# Patient Record
Sex: MALE | Race: ASIAN | NOT HISPANIC OR LATINO | Employment: FULL TIME | URBAN - METROPOLITAN AREA
[De-identification: names, ages, dates, MRNs, and addresses within clinical notes are randomized per-mention and may not be internally consistent; named-entity substitution may affect disease eponyms.]

---

## 2017-12-14 ENCOUNTER — OFFICE VISIT (OUTPATIENT)
Dept: INTERNAL MEDICINE | Facility: CLINIC | Age: 27
End: 2017-12-14
Payer: COMMERCIAL

## 2017-12-14 ENCOUNTER — HOSPITAL ENCOUNTER (OUTPATIENT)
Dept: RADIOLOGY | Facility: HOSPITAL | Age: 27
Discharge: HOME | End: 2017-12-14
Attending: FAMILY MEDICINE
Payer: COMMERCIAL

## 2017-12-14 VITALS
DIASTOLIC BLOOD PRESSURE: 62 MMHG | WEIGHT: 160.94 LBS | HEART RATE: 68 BPM | HEIGHT: 67 IN | BODY MASS INDEX: 25.26 KG/M2 | TEMPERATURE: 98 F | SYSTOLIC BLOOD PRESSURE: 110 MMHG

## 2017-12-14 DIAGNOSIS — M25.532 LEFT WRIST PAIN: ICD-10-CM

## 2017-12-14 DIAGNOSIS — Z00.00 GENERAL MEDICAL EXAM: ICD-10-CM

## 2017-12-14 DIAGNOSIS — M25.532 LEFT WRIST PAIN: Primary | ICD-10-CM

## 2017-12-14 PROCEDURE — 90686 IIV4 VACC NO PRSV 0.5 ML IM: CPT | Mod: S$GLB,,, | Performed by: FAMILY MEDICINE

## 2017-12-14 PROCEDURE — 99999 PR PBB SHADOW E&M-NEW PATIENT-LVL IV: CPT | Mod: PBBFAC,,, | Performed by: FAMILY MEDICINE

## 2017-12-14 PROCEDURE — 73130 X-RAY EXAM OF HAND: CPT | Mod: TC,PO,LT

## 2017-12-14 PROCEDURE — 99385 PREV VISIT NEW AGE 18-39: CPT | Mod: 25,S$GLB,, | Performed by: FAMILY MEDICINE

## 2017-12-14 PROCEDURE — 73130 X-RAY EXAM OF HAND: CPT | Mod: 26,LT,, | Performed by: RADIOLOGY

## 2017-12-14 PROCEDURE — 90471 IMMUNIZATION ADMIN: CPT | Mod: S$GLB,,, | Performed by: FAMILY MEDICINE

## 2017-12-15 ENCOUNTER — TELEPHONE (OUTPATIENT)
Dept: INTERNAL MEDICINE | Facility: CLINIC | Age: 27
End: 2017-12-15

## 2017-12-15 NOTE — TELEPHONE ENCOUNTER
Spoke with pt. Advised of results and recommendations. Pt voiced understanding and has appt with ortho.

## 2017-12-15 NOTE — TELEPHONE ENCOUNTER
----- Message from Elie Mendes MD sent at 12/15/2017  9:44 AM CST -----  Pt hasn't yet signed up for ARNALDO so please call him and tell him radiology review of his left hand xray was normal. Take otc ibuprofen as we discussed and referral was given for ortho. thx

## 2017-12-18 NOTE — PROGRESS NOTES
Subjective:   Patient ID: Indigo Garcia is a 27 y.o. male.    Chief Complaint: Annual Exam      HPI  28 yo here for annual exam and having left wrist pain that is gradually getting worse. Sometimes it seems as if hand is a bit weaker. No paresthesias. No ho trauma to area. Nothing seems to make it worse other than palpation worsens pain. NSAIDs help for a bit. No shoulder or neck pain.   Patient queried and denies any further complaints    PREVENTIVE MED  Diet  Exercise  Colorectal Ca  Alcohol use  Tobacco  BP  Depression  Type 2 DM  Hep C  STD  Vision  ALL REVIEWED      PAST MEDICAL HISTORY:  History reviewed. No pertinent past medical history.    PAST SURGICAL HISTORY:  History reviewed. No pertinent surgical history.    SOCIAL HISTORY:  Social History     Social History    Marital status: Single     Spouse name: N/A    Number of children: N/A    Years of education: N/A     Occupational History    Not on file.     Social History Main Topics    Smoking status: Never Smoker    Smokeless tobacco: Never Used    Alcohol use Not on file    Drug use: Unknown    Sexual activity: Not on file     Other Topics Concern    Not on file     Social History Narrative    No narrative on file       FAMILY HISTORY:  History reviewed. No pertinent family history.    ALLERGIES AND MEDICATIONS: updated and reviewed.  Review of patient's allergies indicates:  No Known Allergies  No current outpatient prescriptions on file.    Review of Systems   Constitutional: Negative for activity change, appetite change, chills, diaphoresis, fatigue, fever and unexpected weight change.   HENT: Negative for congestion, ear discharge, ear pain, postnasal drip, rhinorrhea, sneezing and sore throat.    Eyes: Negative for photophobia and discharge.   Respiratory: Negative for cough, chest tightness, shortness of breath and wheezing.    Cardiovascular: Negative for chest pain and palpitations.   Gastrointestinal: Negative for abdominal distention,  "abdominal pain, diarrhea, nausea and vomiting.   Genitourinary: Negative for dysuria.   Musculoskeletal: Negative for arthralgias and neck pain.   Skin: Negative for rash.   Neurological: Negative for headaches.       Objective:     Vitals:    12/14/17 1429   BP: 110/62   Pulse: 68   Temp: 98.1 °F (36.7 °C)   TempSrc: Oral   Weight: 73 kg (160 lb 15 oz)   Height: 5' 7" (1.702 m)   PainSc: 0-No pain     Body mass index is 25.21 kg/m².    Physical Exam   Constitutional: He appears well-developed and well-nourished.   HENT:   Head: Normocephalic and atraumatic.   Right Ear: Hearing, tympanic membrane, external ear and ear canal normal. No drainage or swelling. No decreased hearing is noted.   Left Ear: Hearing, tympanic membrane, external ear and ear canal normal. No drainage or swelling. No decreased hearing is noted.   Nose: Nose normal. No rhinorrhea.   Mouth/Throat: Oropharynx is clear and moist. No oropharyngeal exudate, posterior oropharyngeal edema or posterior oropharyngeal erythema.   Eyes: Conjunctivae, EOM and lids are normal. Pupils are equal, round, and reactive to light. Right eye exhibits no discharge and no exudate. Left eye exhibits no discharge and no exudate. Right conjunctiva is not injected. Left conjunctiva is not injected.   Neck: Trachea normal and full passive range of motion without pain. Normal carotid pulses, no hepatojugular reflux and no JVD present. Carotid bruit is not present. No neck rigidity. No edema and no erythema present. No thyroid mass and no thyromegaly present.   Cardiovascular: Normal rate, regular rhythm and normal heart sounds.    Pulmonary/Chest: Effort normal. No respiratory distress.   Abdominal: Soft. Normal appearance and bowel sounds are normal. There is no tenderness. There is negative Beckwith's sign.   Musculoskeletal:        Left wrist: He exhibits tenderness (no tenderness in anatomical snuff box.) and bony tenderness. He exhibits normal range of motion. "   Lymphadenopathy:     He has no cervical adenopathy.   Neurological: He is alert.   Skin: Skin is warm and dry.   Psychiatric: He has a normal mood and affect. His speech is normal and behavior is normal.       Assessment and Plan:   Indigo was seen today for annual exam.    Diagnoses and all orders for this visit:    Left wrist pain    -     Ambulatory Referral to Orthopedics  -     X-Ray Hand 3 View Left; Future    General medical exam  -     CBC auto differential; Future  -     Comprehensive metabolic panel; Future  -     Lipid panel; Future  -     TSH; Future  -     T4, free; Future  -     Vitamin D; Future    Other orders  -     Influenza - Quadrivalent (3 years & older) (PF)        Return in about 1 year (around 12/14/2018).    THIS NOTE WILL BE SHARED WITH THE PATIENT.

## 2017-12-19 ENCOUNTER — LAB VISIT (OUTPATIENT)
Dept: LAB | Facility: HOSPITAL | Age: 27
End: 2017-12-19
Attending: FAMILY MEDICINE
Payer: COMMERCIAL

## 2017-12-19 DIAGNOSIS — Z00.00 GENERAL MEDICAL EXAM: ICD-10-CM

## 2017-12-19 LAB
25(OH)D3+25(OH)D2 SERPL-MCNC: 7 NG/ML
ALBUMIN SERPL BCP-MCNC: 3.9 G/DL
ALP SERPL-CCNC: 89 U/L
ALT SERPL W/O P-5'-P-CCNC: 8 U/L
ANION GAP SERPL CALC-SCNC: 8 MMOL/L
AST SERPL-CCNC: 18 U/L
BASOPHILS # BLD AUTO: 0.06 K/UL
BASOPHILS NFR BLD: 0.7 %
BILIRUB SERPL-MCNC: 1.1 MG/DL
BUN SERPL-MCNC: 19 MG/DL
CALCIUM SERPL-MCNC: 9.7 MG/DL
CHLORIDE SERPL-SCNC: 102 MMOL/L
CHOLEST SERPL-MCNC: 174 MG/DL
CHOLEST/HDLC SERPL: 2.6 {RATIO}
CO2 SERPL-SCNC: 28 MMOL/L
CREAT SERPL-MCNC: 0.9 MG/DL
DIFFERENTIAL METHOD: NORMAL
EOSINOPHIL # BLD AUTO: 0.1 K/UL
EOSINOPHIL NFR BLD: 1.4 %
ERYTHROCYTE [DISTWIDTH] IN BLOOD BY AUTOMATED COUNT: 12.5 %
EST. GFR  (AFRICAN AMERICAN): >60 ML/MIN/1.73 M^2
EST. GFR  (NON AFRICAN AMERICAN): >60 ML/MIN/1.73 M^2
GLUCOSE SERPL-MCNC: 94 MG/DL
HCT VFR BLD AUTO: 47.7 %
HDLC SERPL-MCNC: 68 MG/DL
HDLC SERPL: 39.1 %
HGB BLD-MCNC: 15.8 G/DL
IMM GRANULOCYTES # BLD AUTO: 0.02 K/UL
IMM GRANULOCYTES NFR BLD AUTO: 0.2 %
LDLC SERPL CALC-MCNC: 77.4 MG/DL
LYMPHOCYTES # BLD AUTO: 2.3 K/UL
LYMPHOCYTES NFR BLD: 27 %
MCH RBC QN AUTO: 29.7 PG
MCHC RBC AUTO-ENTMCNC: 33.1 G/DL
MCV RBC AUTO: 90 FL
MONOCYTES # BLD AUTO: 0.6 K/UL
MONOCYTES NFR BLD: 7.3 %
NEUTROPHILS # BLD AUTO: 5.4 K/UL
NEUTROPHILS NFR BLD: 63.4 %
NONHDLC SERPL-MCNC: 106 MG/DL
NRBC BLD-RTO: 0 /100 WBC
PLATELET # BLD AUTO: 250 K/UL
PMV BLD AUTO: 11 FL
POTASSIUM SERPL-SCNC: 3.9 MMOL/L
PROT SERPL-MCNC: 7 G/DL
RBC # BLD AUTO: 5.32 M/UL
SODIUM SERPL-SCNC: 138 MMOL/L
T4 FREE SERPL-MCNC: 1.01 NG/DL
TRIGL SERPL-MCNC: 143 MG/DL
TSH SERPL DL<=0.005 MIU/L-ACNC: 1.47 UIU/ML
WBC # BLD AUTO: 8.53 K/UL

## 2017-12-19 PROCEDURE — 84439 ASSAY OF FREE THYROXINE: CPT

## 2017-12-19 PROCEDURE — 80061 LIPID PANEL: CPT

## 2017-12-19 PROCEDURE — 36415 COLL VENOUS BLD VENIPUNCTURE: CPT | Mod: PO

## 2017-12-19 PROCEDURE — 85025 COMPLETE CBC W/AUTO DIFF WBC: CPT

## 2017-12-19 PROCEDURE — 82306 VITAMIN D 25 HYDROXY: CPT

## 2017-12-19 PROCEDURE — 84443 ASSAY THYROID STIM HORMONE: CPT

## 2017-12-19 PROCEDURE — 80053 COMPREHEN METABOLIC PANEL: CPT

## 2017-12-20 ENCOUNTER — TELEPHONE (OUTPATIENT)
Dept: INTERNAL MEDICINE | Facility: CLINIC | Age: 27
End: 2017-12-20

## 2017-12-20 RX ORDER — ERGOCALCIFEROL 1.25 MG/1
50000 CAPSULE ORAL
Qty: 12 CAPSULE | Refills: 0 | Status: SHIPPED | OUTPATIENT
Start: 2017-12-20 | End: 2018-03-18 | Stop reason: SDUPTHER

## 2017-12-20 NOTE — TELEPHONE ENCOUNTER
LMOVM for pt to call clinic  Spoke with pt, advised of results and recommendations. Pharmacy added. Pt voiced understanding , copy of labs sent to pt with instructions and pt states he will sign up for ARNALDO   Dr Mendes informed

## 2017-12-20 NOTE — TELEPHONE ENCOUNTER
----- Message from Elie Mendes MD sent at 12/19/2017  3:21 PM CST -----  Please call pt re results asap. His vit D is very low. I rec a Rx vit D that he will take once weekly for 8 weeks then take otc vit D 2,000 IU daily. Confirm his pharmacy please and I will send in a Rx.  His lipid panel looks very good. The rest is good. Encourage him to sign up for ARNALDO. Thx.

## 2018-02-21 ENCOUNTER — OFFICE VISIT (OUTPATIENT)
Dept: ORTHOPEDICS | Facility: CLINIC | Age: 28
End: 2018-02-21
Attending: ORTHOPAEDIC SURGERY
Payer: COMMERCIAL

## 2018-02-21 VITALS — HEIGHT: 67 IN | WEIGHT: 160 LBS | BODY MASS INDEX: 25.11 KG/M2

## 2018-02-21 DIAGNOSIS — M65.4 DE QUERVAIN'S TENOSYNOVITIS, LEFT: ICD-10-CM

## 2018-02-21 PROCEDURE — 20550 NJX 1 TENDON SHEATH/LIGAMENT: CPT | Mod: LT,S$GLB,, | Performed by: ORTHOPAEDIC SURGERY

## 2018-02-21 PROCEDURE — 99203 OFFICE O/P NEW LOW 30 MIN: CPT | Mod: 25,S$GLB,, | Performed by: ORTHOPAEDIC SURGERY

## 2018-02-21 PROCEDURE — 3008F BODY MASS INDEX DOCD: CPT | Mod: S$GLB,,, | Performed by: ORTHOPAEDIC SURGERY

## 2018-02-21 PROCEDURE — 99999 PR PBB SHADOW E&M-EST. PATIENT-LVL II: CPT | Mod: PBBFAC,,, | Performed by: ORTHOPAEDIC SURGERY

## 2018-02-21 RX ORDER — TRIAMCINOLONE ACETONIDE 40 MG/ML
40 INJECTION, SUSPENSION INTRA-ARTICULAR; INTRAMUSCULAR
Status: COMPLETED | OUTPATIENT
Start: 2018-02-21 | End: 2018-02-21

## 2018-02-21 RX ADMIN — TRIAMCINOLONE ACETONIDE 40 MG: 40 INJECTION, SUSPENSION INTRA-ARTICULAR; INTRAMUSCULAR at 03:02

## 2018-02-21 NOTE — PROGRESS NOTES
INITIAL VISIT HISTORY:  A 27-year-old male presents for evaluation of left hand   and wrist pain of several months' duration.  Most of the pain seems to be   localized to the base of the left thumb.  There is no history of trauma, but   occasionally he does experience a pop sensation around that same area, which   does cause pain.  No numbness or tingling is reported.    PAST MEDICAL HISTORY:  Unremarkable.    PAST SURGICAL HISTORY:  None.    FAMILY HISTORY:  Negative.    SOCIAL HISTORY:  The patient does not smoke or drink.    REVIEW OF SYSTEMS:  Negative fever, chills, rashes.    CURRENT MEDICATIONS:  Ergocalciferol.    ALLERGIES:  None.    PHYSICAL EXAMINATION:  GENERAL:  Well-developed, well-nourished male in no acute distress, alert and   oriented x3.  MUSCULOSKELETAL:  Examination of the upper extremities significant for the left   hand and wrist, demonstrating some mild tenderness over the first dorsal   compartment left wrist with a positive Finkelstein test, reproducing symptoms.    Range of motion in wrist and fingers full.  Tinel sign negative at the wrist.    Phalen test negative at the wrist.  No numbness.  Sensation intact in all   digits.    X-RAYS:  AP and lateral, left hand, demonstrate no significant abnormalities.    IMPRESSION:  De Quervain tendinitis, left wrist.    PLAN:  I explained the nature of the problem to the patient.  Recommended and   performed an injection as follows.  After pause for timeout, he identified the   left wrist injected first dorsal compartment with combination of Kenalog 20 mg,   0.5 mL Xylocaine, sterile technique, which he tolerated well without   complication.    Recommended ice tonight, continued use of the wrist splint, which he currently   has and avoidance of heavy lifting.  Follow up in one month for recheck.      FREDDIE  dd: 02/21/2018 15:10:54 (CST)  td: 02/22/2018 10:14:11 (CST)  Doc ID   #[id number]  Job ID #[id number]    CC:

## 2018-02-21 NOTE — LETTER
February 21, 2018        Elie Mendes MD  [address]  [address]  [place] [address]             [facility]  [address], [address]  [facility] [address]  Phone: [phone]   Patient: Indigo Garcia   MR Number: [id number]   YOB: 1990   Date of Visit: 2/21/2018       Dear Dr. Mendes:    Thank you for referring Indigo Garcia to me for evaluation. Below are the relevant portions of my assessment and plan of care.            If you have questions, please do not hesitate to call me. I look forward to following Indigo along with you.    Sincerely,      Mario Rasheed Jr., MD           CC  No Recipients

## 2018-03-19 RX ORDER — ERGOCALCIFEROL 1.25 MG/1
50000 CAPSULE ORAL
Qty: 12 CAPSULE | Refills: 3 | Status: SHIPPED | OUTPATIENT
Start: 2018-03-19

## 2018-09-16 ENCOUNTER — HOSPITAL ENCOUNTER (EMERGENCY)
Facility: HOSPITAL | Age: 28
Discharge: HOME | End: 2018-09-16
Attending: EMERGENCY MEDICINE
Payer: COMMERCIAL

## 2018-09-16 VITALS
HEART RATE: 91 BPM | DIASTOLIC BLOOD PRESSURE: 64 MMHG | OXYGEN SATURATION: 98 % | WEIGHT: 160 LBS | RESPIRATION RATE: 18 BRPM | BODY MASS INDEX: 25.11 KG/M2 | TEMPERATURE: 99 F | HEIGHT: 67 IN | SYSTOLIC BLOOD PRESSURE: 118 MMHG

## 2018-09-16 DIAGNOSIS — T07.XXXA ABRASIONS OF MULTIPLE SITES: Primary | ICD-10-CM

## 2018-09-16 PROCEDURE — 25000003 PHARM REV CODE 250: Performed by: EMERGENCY MEDICINE

## 2018-09-16 PROCEDURE — 90715 TDAP VACCINE 7 YRS/> IM: CPT | Performed by: EMERGENCY MEDICINE

## 2018-09-16 PROCEDURE — 99284 EMERGENCY DEPT VISIT MOD MDM: CPT | Mod: ,,, | Performed by: EMERGENCY MEDICINE

## 2018-09-16 PROCEDURE — 63600175 PHARM REV CODE 636 W HCPCS: Performed by: EMERGENCY MEDICINE

## 2018-09-16 PROCEDURE — 90471 IMMUNIZATION ADMIN: CPT | Performed by: EMERGENCY MEDICINE

## 2018-09-16 PROCEDURE — 99284 EMERGENCY DEPT VISIT MOD MDM: CPT

## 2018-09-16 RX ADMIN — BACITRACIN ZINC NEOMYCIN SULFATE POLYMYXIN B SULFATE 15 G: 400; 3.5; 5 OINTMENT TOPICAL at 06:09

## 2018-09-16 RX ADMIN — CLOSTRIDIUM TETANI TOXOID ANTIGEN (FORMALDEHYDE INACTIVATED), CORYNEBACTERIUM DIPHTHERIAE TOXOID ANTIGEN (FORMALDEHYDE INACTIVATED), BORDETELLA PERTUSSIS TOXOID ANTIGEN (GLUTARALDEHYDE INACTIVATED), BORDETELLA PERTUSSIS FILAMENTOUS HEMAGGLUTININ ANTIGEN (FORMALDEHYDE INACTIVATED), BORDETELLA PERTUSSIS PERTACTIN ANTIGEN, AND BORDETELLA PERTUSSIS FIMBRIAE 2/3 ANTIGEN 0.5 ML: 5; 2; 2.5; 5; 3; 5 INJECTION, SUSPENSION INTRAMUSCULAR at 06:09
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
## 2020-10-08 ENCOUNTER — PATIENT MESSAGE (OUTPATIENT)
Dept: PRIMARY CARE CLINIC | Facility: CLINIC | Age: 30
End: 2020-10-08